# Patient Record
(demographics unavailable — no encounter records)

---

## 2025-06-11 NOTE — HISTORY OF PRESENT ILLNESS
[FreeTextEntry1] : 29 yo G0 w/ LMP 6/6/25 here for annual exam. Reports menses are regular and not painful but extremely heavy. Also always has a day of spotting mid-cycle at time of ovulation. Sexually active, no issues. Uses condoms. No abdominal pain or urinary complaints. Interested in fertility by the end of the year. Was previously on OCPs which significantly helped with the bleeding.   Last pap 2024 NILM history of HSV no recent outbreaks

## 2025-06-11 NOTE — DISCUSSION/SUMMARY
[FreeTextEntry1] : 27 yo G0, annual exam  - f/up pap - restart OCPs, discussed side effects, risks, appropriate usage, etc - also discussed PNVs, timed intercourse, ovulation tracking, etc - return to office 1yr annual exam or PRN